# Patient Record
Sex: MALE | Race: OTHER | URBAN - METROPOLITAN AREA
[De-identification: names, ages, dates, MRNs, and addresses within clinical notes are randomized per-mention and may not be internally consistent; named-entity substitution may affect disease eponyms.]

---

## 2023-04-16 ENCOUNTER — EMERGENCY (EMERGENCY)
Facility: HOSPITAL | Age: 58
LOS: 1 days | Discharge: HOME | End: 2023-04-16
Admitting: EMERGENCY MEDICINE
Payer: MEDICAID

## 2023-04-16 VITALS
RESPIRATION RATE: 18 BRPM | TEMPERATURE: 97 F | WEIGHT: 187.39 LBS | HEART RATE: 63 BPM | DIASTOLIC BLOOD PRESSURE: 83 MMHG | SYSTOLIC BLOOD PRESSURE: 154 MMHG | HEIGHT: 70 IN | OXYGEN SATURATION: 96 %
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
## 2023-04-16 DIAGNOSIS — H57.11 OCULAR PAIN, RIGHT EYE: ICD-10-CM

## 2023-04-16 DIAGNOSIS — S01.101A UNSPECIFIED OPEN WOUND OF RIGHT EYELID AND PERIOCULAR AREA, INITIAL ENCOUNTER: ICD-10-CM
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
## 2023-04-16 DIAGNOSIS — S05.01XA INJURY OF CONJUNCTIVA AND CORNEAL ABRASION WITHOUT FOREIGN BODY, RIGHT EYE, INITIAL ENCOUNTER: ICD-10-CM

## 2023-04-16 PROCEDURE — 99283 EMERGENCY DEPT VISIT LOW MDM: CPT

## 2023-04-16 PROCEDURE — 99284 EMERGENCY DEPT VISIT MOD MDM: CPT

## 2023-04-16 RX ORDER — ERYTHROMYCIN BASE 5 MG/GRAM
1 OINTMENT (GRAM) OPHTHALMIC (EYE)
Qty: 1 | Refills: 0
Start: 2023-04-16 | End: 2023-04-20

## 2023-04-16 RX ORDER — ERYTHROMYCIN BASE 5 MG/GRAM
1 OINTMENT (GRAM) OPHTHALMIC (EYE)
Refills: 0
Start: 2023-04-16

## 2023-04-16 NOTE — ED PROVIDER NOTE - NSFOLLOWUPINSTRUCTIONS_ED_ALL_ED_FT
Corneal Abrasion    A corneal abrasion is a scratch or injury to the clear covering over the front of the eye (cornea). Your cornea forms a clear dome that protects your eye and helps to focus your vision. Your cornea is made up of many layers, but the surface layer is one of the most sensitive tissues in your body. A corneal abrasion can be very painful.    If a corneal abrasion is not treated, it can become infected and cause an ulcer. This can lead to scarring. A scarred cornea can affect your vision. Sometimes abrasions come back in the same area, even after the original injury has healed.    What are the causes?  This condition may be caused by:  A poke in the eye.  A gritty or irritating substance (foreign body) in the eye.  Excessive eye rubbing.  Very dry eyes.  Certain eye infections.  Contact lenses that fit poorly or are worn for a long period of time. You can also injure your cornea when putting contact lenses in your eye or taking them out.  Eye surgery.  Certain cornea problems may increase the chance of a corneal abrasion.  Sometimes, the cause is not known.    What are the signs or symptoms?  Symptoms of this condition include:  Eye pain. The pain may get worse when you open and close your eye or when you move your eye.  A feeling of something stuck in your eye.  Tearing, redness, and sensitivity to light.  Having trouble keeping your eye open, or not being able to keep it open.  Blurred vision.  Headache.  How is this diagnosed?  You may work with a health care provider who specializes in diseases and conditions of the eye (ophthalmologist). This condition may be diagnosed based on your medical history, symptoms, and an eye exam.    Before the eye exam, numbing drops may be put into your eye. You may also have dye put in your eye with a dropper or a small paper strip. The dye makes the abrasion easy to see when your ophthalmologist examines your eye with a light. Your ophthalmologist may look at your eye through an eye scope (slit lamp).    How is this treated?  Treatment may vary depending on the cause of your condition, and it may include:  Washing out your eye.  Removing any foreign bodies that are in your eye.  Using antibiotic drops or ointment to treat or prevent an infection.  Using a dilating drop to decrease inflammation and pain.  Using steroid drops or ointment to treat redness, irritation, or inflammation.  Applying a cold, wet cloth (cold compress) or ice pack to ease the pain.  Taking pain medicine by mouth (orally).  In some cases, an eye patch or bandage soft contact lens might also be used. An eye patch should not be used if the corneal abrasion was related to contact lens wear as it can increase the chance of infection in these eyes.    Follow these instructions at home:  Medicines    Use eye drops or ointments as told by your health care provider.  If you were prescribed antibiotic drops or ointment, use them as told by your health care provider. Do not stop using the antibiotic even if you start to feel better.  Take over-the-counter and prescription medicines only as told by your health care provider.  Ask your health care provider if the medicine prescribed to you:  Requires you to avoid driving or using heavy machinery.  Can cause constipation. You may need to take these actions to prevent or treat constipation:  Drink enough fluid to keep your urine pale yellow.  Take over-the-counter or prescription medicines.  Eat foods that are high in fiber, such as beans, whole grains, and fresh fruits and vegetables.  Limit foods that are high in fat and processed sugars, such as fried or sweet foods.  Eye patch use    If you have an eye patch, wear it as told by your health care provider.  Do not drive or use machinery while wearing an eye patch. Your ability to  distances will be impaired.  Follow instructions from your health care provider about when to remove the patch.  General instructions    Ask your health care provider whether you can use a cold compress on your eye to relieve pain.  Do not rub or touch your eye. Do not wash out your eye.  Do not wear contact lenses until your health care provider says that this is okay.  Avoid bright light and eye strain.  Keep all follow-up visits as told by your health care provider. This is important for preventing infection and vision loss.  Contact a health care provider if:  You continue to have eye pain and other symptoms for more than 2 days.  You have new symptoms, such as worse redness, tearing, or discharge.  You have discharge that makes your eyelids stick together in the morning.  Your eye patch becomes so loose that you can blink your eye.  Symptoms return after the original abrasion has healed.  Get help right away if:  You have severe eye pain that does not get better with medicine.  You have vision loss.  Summary  A corneal abrasion is a scratch or injury to the clear covering over the front of the eye (cornea).  It is important to get treatment for a corneal abrasion. If this problem is not treated, it can affect your vision.  Use eye drops or ointments as told by your health care provider.  If you have an eye patch, do not drive or use machinery while wearing it. Your ability to  distances will be impaired.  Let your health care provider know if your symptoms continue for more than 2 days.  This information is not intended to replace advice given to you by your health care provider. Make sure you discuss any questions you have with your health care provider.

## 2023-04-16 NOTE — ED ADULT NURSE NOTE - CHPI ED NUR SYMPTOMS NEG
no blurred vision/no discharge/no double vision/no drainage/no eye lid swelling/no photophobia/no purulent drainage

## 2023-04-16 NOTE — ED PROVIDER NOTE - EYE EXAM METHOD
small uptake of dye to the right sclera, bordering the iris/fluorescein/woods lamp small uptake of dye to the right sclera, bordering the iris but does not cross the iris/fluorescein/woods lamp

## 2023-04-16 NOTE — ED PROVIDER NOTE - PATIENT PORTAL LINK FT
You can access the FollowMyHealth Patient Portal offered by [facility] by registering at the following website: [email or web address]. By joining [employer]’s FollowMyHealth portal, you will also be able to view your health information using other applications (apps) compatible with our system.

## 2023-04-16 NOTE — ED PROVIDER NOTE - NSPTACCESSSVCSAPPTDETAILS_ED_ALL_ED_FT
Please reach out to April Angeles ([facility] ED clinical referral coordinator) to assist you with your follow-up appointment.     Monday - Friday 11am-7pm  [phone]  [email or web address]

## 2023-04-16 NOTE — ED PROVIDER NOTE - NSFOLLOWUPCLINICS_GEN_ALL_ED_FT
[facility]hthalmology [facility]  Ophthalmology  [address], [address]  [place], [place] [address]  Phone: [phone]  Fax:   Scheduled Appointment: 4/17/2023

## 2023-04-16 NOTE — ED ADULT NURSE NOTE - NS ED NOTE ABUSE RESPONSE YN
The Clear Care Contact Lens Solution system (and now another new one, B [employer] L  Peroxi Clear) do an excellent job of cleaning and disinfecting contact lenses without rubbing them.  The most important feature of this system is that when your lenses are ready in the morning, they are soaking in sterile, PRESERVATIVE-FREE saline.    We have found it is an excellent problem solver for patients who have become  extra sensitive to different multipurpose solutions, or just with sensitive eyes.    HOWEVER, YOU MUST BE CAREFUL TO FOLLOW THE DIRECTIONS BECAUSE  THE SOLUTION ITSELF STARTS OUT AS HYDROGEN PEROXIDE AND COULD   IRRITATE YOUR EYES.    Therefore, remember:  PLEASE READ & FOLLOW THE INSTRUCTIONS ON THE BOX.    1) Do NOT put clear care or peroxi care directly onto your eye.  2) Do NOT rinse your lens with clear care or peroxi care from the bottle before inserting it.  (You may use the sterile solution in the case after it has \"converted\" over night  as a rinse- it is preservative free saline)  3) Use only the case provided in the soution kit, and only fill it to the line.  4) Replace your case when you purchase a new kit (the case can \"wear out\")  and each new kit includes a new case for that purpose.  5) Remember that the conversion of Clear Care from hydrogen peroxide is the result  of the oxygen bubbling out of the solution.  This takes 6 hours, so if you have  a \"short night\" you may need to use a different solution in that rare occasion.    NOTE: THE PEROXI CARE system is ready in 4 hours instead of 6, so it may  be a more convenient system for some people.     Please call us at [phone] if you have any questions.     Yes

## 2023-04-16 NOTE — ED ADULT NURSE NOTE - OBJECTIVE STATEMENT
Patient /co of right eye pain, states at work was using a  to cut copper/metal and felt a piece of metal get into the right eye.  Noted redness, mild tearing, no vision disturbance.  Visual acuity done;  20/20 on all visual fields.  Did not take pain med; unsure of Tetanus vaccine status.

## 2023-04-16 NOTE — ED ADULT NURSE NOTE - NSIMPLEMENTINTERV_GEN_ALL_ED
HTN controlled.    Continues with weight loss progress.     Mental health is stable and managed by psychiatry.    Return in 6 months or sooner if needed.    Resolving viral gastroenteritis.    Push fluids, and rest as possible.   
Implemented All Universal Safety Interventions:  [employer] to call system. Call bell, personal items and telephone within reach. Instruct patient to call for assistance. Room bathroom lighting operational. Non-slip footwear when patient is off stretcher. Physically safe environment: no spills, clutter or unnecessary equipment. Stretcher in lowest position, wheels locked, appropriate side rails in place.

## 2023-04-16 NOTE — ED PROVIDER NOTE - CLINICAL SUMMARY MEDICAL DECISION MAKING FREE TEXT BOX
58yo M with no pmhx presenting right right eye pain x yesterday. +foreign body sensation s/p working with metal yesterday. Mild erythema of the sclera and uptake of florescein dye to the right sclera bordering the iris, suspicious of corneal abrasion. Small wound to inner bottom eyelid without foreign body identified. Will cover with antibiotics and have close follow up with ophthalmology

## 2023-04-16 NOTE — ED PROVIDER NOTE - OBJECTIVE STATEMENT
no pmhx. Presenting with right eye pain x yesterday. 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 since then experiencing pain with eye movement and foreign body sensation. No photophobia. No fever. Attempted to use eye drops 58yo M with no pmhx presenting with right eye pain x yesterday. 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 Since then experiencing pain with eye movement and foreign body sensation. No photophobia. No loss of vision or HA. No fever. Attempted to use eye drops without much relief. Does not wear glasses/contacts.